# Patient Record
Sex: MALE | Race: BLACK OR AFRICAN AMERICAN | Employment: FULL TIME | URBAN - NONMETROPOLITAN AREA
[De-identification: names, ages, dates, MRNs, and addresses within clinical notes are randomized per-mention and may not be internally consistent; named-entity substitution may affect disease eponyms.]

---

## 2019-02-02 ENCOUNTER — HOSPITAL ENCOUNTER (EMERGENCY)
Age: 24
Discharge: HOME | End: 2019-02-02

## 2019-02-02 VITALS
BODY MASS INDEX: 22.36 KG/M2 | TEMPERATURE: 98 F | SYSTOLIC BLOOD PRESSURE: 131 MMHG | HEART RATE: 73 BPM | WEIGHT: 131 LBS | HEIGHT: 64 IN | RESPIRATION RATE: 18 BRPM | DIASTOLIC BLOOD PRESSURE: 70 MMHG | OXYGEN SATURATION: 98 %

## 2019-02-02 DIAGNOSIS — R59.0 POSTERIOR AURICULAR LYMPHADENOPATHY: Primary | ICD-10-CM

## 2019-02-02 PROCEDURE — 99283 EMERGENCY DEPT VISIT LOW MDM: CPT

## 2019-02-02 ASSESSMENT — ENCOUNTER SYMPTOMS
WHEEZING: 0
VOMITING: 0
BACK PAIN: 0
EYE DISCHARGE: 0
ABDOMINAL PAIN: 0
COUGH: 0
NAUSEA: 0
SHORTNESS OF BREATH: 0
RHINORRHEA: 0
EYE REDNESS: 0
DIARRHEA: 0
SORE THROAT: 0

## 2019-02-02 ASSESSMENT — PAIN DESCRIPTION - LOCATION: LOCATION: HEAD

## 2019-02-02 ASSESSMENT — PAIN DESCRIPTION - DESCRIPTORS: DESCRIPTORS: ACHING;SHOOTING;SHARP

## 2019-02-02 ASSESSMENT — PAIN SCALES - GENERAL: PAINLEVEL_OUTOF10: 3

## 2019-02-02 ASSESSMENT — PAIN DESCRIPTION - ORIENTATION: ORIENTATION: RIGHT

## 2021-05-17 ENCOUNTER — HOSPITAL ENCOUNTER (EMERGENCY)
Age: 26
Discharge: HOME | End: 2021-05-18

## 2021-05-17 VITALS
TEMPERATURE: 97.9 F | OXYGEN SATURATION: 100 % | BODY MASS INDEX: 23.05 KG/M2 | HEART RATE: 73 BPM | DIASTOLIC BLOOD PRESSURE: 75 MMHG | SYSTOLIC BLOOD PRESSURE: 151 MMHG | RESPIRATION RATE: 15 BRPM | WEIGHT: 135 LBS | HEIGHT: 64 IN

## 2021-05-17 DIAGNOSIS — J02.8 BACTERIAL PHARYNGITIS: ICD-10-CM

## 2021-05-17 DIAGNOSIS — B96.89 BACTERIAL PHARYNGITIS: ICD-10-CM

## 2021-05-17 DIAGNOSIS — B34.9 VIRAL ILLNESS: Primary | ICD-10-CM

## 2021-05-17 PROCEDURE — 87804 INFLUENZA ASSAY W/OPTIC: CPT

## 2021-05-17 PROCEDURE — 99282 EMERGENCY DEPT VISIT SF MDM: CPT

## 2021-05-17 PROCEDURE — 87635 SARS-COV-2 COVID-19 AMP PRB: CPT

## 2021-05-17 ASSESSMENT — PAIN DESCRIPTION - DESCRIPTORS: DESCRIPTORS: DISCOMFORT

## 2021-05-18 LAB
FLU A ANTIGEN: NEGATIVE
FLU B ANTIGEN: NEGATIVE
GROUP A STREP CULTURE, REFLEX: NEGATIVE
REFLEX THROAT C + S: NORMAL
SARS-COV-2, NAAT: NOT DETECTED

## 2021-05-18 PROCEDURE — 87070 CULTURE OTHR SPECIMN AEROBIC: CPT

## 2021-05-18 PROCEDURE — 87880 STREP A ASSAY W/OPTIC: CPT

## 2021-05-18 RX ORDER — BENZOCAINE AND MENTHOL, UNSPECIFIED FORM 15; 2.3 MG/1; MG/1
1 LOZENGE ORAL
Qty: 32 LOZENGE | Refills: 0 | Status: SHIPPED | OUTPATIENT
Start: 2021-05-18 | End: 2021-05-23

## 2021-05-18 RX ORDER — AMOXICILLIN AND CLAVULANATE POTASSIUM 875; 125 MG/1; MG/1
1 TABLET, FILM COATED ORAL 2 TIMES DAILY
Qty: 20 TABLET | Refills: 0 | Status: SHIPPED | OUTPATIENT
Start: 2021-05-18 | End: 2021-05-28

## 2021-05-18 NOTE — ED TRIAGE NOTES
Patient presents to he ED via ED lobby with concern for COVID. Patient states that at work today he started to feel generalized body aches and that his throat feels sore. Patient states that when he lays down sometimes it feels hard to breathe.

## 2021-05-20 LAB — THROAT/NOSE CULTURE: NORMAL

## 2021-05-20 ASSESSMENT — ENCOUNTER SYMPTOMS
CHEST TIGHTNESS: 0
BACK PAIN: 0
RHINORRHEA: 0
SORE THROAT: 1
VOMITING: 0
COUGH: 1
NAUSEA: 0

## 2021-05-20 NOTE — ED PROVIDER NOTES
[facility] Emergency Department    CHIEF COMPLAINT       Chief Complaint   Patient presents with    Concern For COVID-19       Nurses Notes reviewed and I agree except as noted in the HPI. HISTORY OF PRESENT ILLNESS    Abel Lee wilver 26 y.o. male who presents to the ED for covid testing. He was at work and started feeling poorly. C/O generalized body aches, sore throat, fatigue and he feels a little SOB when he lays down. HPI was provided by the patient    REVIEW OF SYSTEMS     Review of Systems   Constitutional: Positive for fatigue. Negative for chills and fever. HENT: Positive for sore throat. Negative for congestion, ear discharge, ear pain, postnasal drip and rhinorrhea. Respiratory: Positive for cough. Negative for chest tightness. Gastrointestinal: Negative for nausea and vomiting. Genitourinary: Negative for difficulty urinating, dysuria, enuresis, flank pain and hematuria. Musculoskeletal: Positive for myalgias. Negative for back pain and joint swelling. Skin: Negative for wound. Neurological: Negative for dizziness, light-headedness, numbness and headaches. Psychiatric/Behavioral: Negative for agitation, behavioral problems and confusion. All other systems negative except as noted. PAST MEDICAL HISTORY   No past medical history on file. SURGICALHISTORY      has no past surgical history on file. CURRENT MEDICATIONS       Discharge Medication List as of 5/18/2021  1:05 AM          ALLERGIES     has No Known Allergies. FAMILY HISTORY     has no family status information on file. family history is not on file.     SOCIAL HISTORY       Social History     Socioeconomic History    Marital status: Single     Spouse name: Not on file    Number of children: Not on file    Years of education: Not on file    Highest education level: Not on file   Occupational History    Not on file   Tobacco Use    Smoking status: Not on file   Substance and Sexual Activity    Alcohol use: Not on file    Drug use: Not on file    Sexual activity: Not on file   Other Topics Concern    Not on file   Social History Narrative    Not on file     Social Determinants of Health     Financial Resource Strain:     Difficulty of Paying Living Expenses:    Food Insecurity:     Worried About Running Out of Food in the Last Year:     [address] in the Last Year:    Transportation Needs:     Lack of Transportation (Medical):  Lack of Transportation (Non-Medical):    Physical Activity:     Days of Exercise per Week:     Minutes of Exercise per Session:    Stress:     Feeling of Stress :    Social Connections:     Frequency of Communication with Friends and Family:     Frequency of Social Gatherings with Friends and Family:     Attends L[employer] day Services:     Active Member of Clubs or Organizations:     Attends Club or Organization Meetings:     Marital Status:    Intimate Partner Violence:     Fear of Current or Ex-Partner:     Emotionally Abused:     Physically Abused:     Sexually Abused:        PHYSICAL EXAM     INITIAL VITALS:  height is 5' 4\" (1.626 m) and weight is 135 lb (61.2 kg). His oral temperature is 97.9 °F (36.6 °C). His blood pressure is 151/75 (abnormal) and his pulse is 73. His respiration is 15 and oxygen saturation is 100%. Physical Exam  Vitals and nursing note reviewed. Constitutional:       General: He is not in acute distress. Appearance: He is well-developed. He is not diaphoretic. HENT:      Head: Normocephalic and atraumatic. Nose: Congestion present. Mouth/Throat:      Mouth: Mucous membranes are moist.      Pharynx: Oropharyngeal exudate and posterior oropharyngeal erythema present. Eyes:      General:         Right eye: No discharge. Left eye: No discharge. Conjunctiva/sclera: Conjunctivae normal.   Neck:      Trachea: No tracheal deviation.    Cardiovascular:      Rate and Rhythm: Normal rate and regular rhythm. Heart sounds: Normal heart sounds. No murmur heard. No gallop. Comments: Normal capillary refill  Pulmonary:      Effort: Pulmonary effort is normal. No respiratory distress. Breath sounds: Normal breath sounds. No stridor. Abdominal:      General: Bowel sounds are normal.      Palpations: Abdomen is soft. Musculoskeletal:         General: No tenderness or deformity. Normal range of motion. Cervical back: Normal range of motion. Lymphadenopathy:      Cervical: Cervical adenopathy present. Skin:     General: Skin is warm and dry. Capillary Refill: Capillary refill takes less than 2 seconds. Coloration: Skin is not pale. Findings: No erythema or rash. Neurological:      General: No focal deficit present. Mental Status: He is alert and oriented to person, place, and time. Cranial Nerves: No cranial nerve deficit. Psychiatric:         Mood and Affect: Mood normal.         Behavior: Behavior normal.         DIFFERENTIAL DIAGNOSIS:   flu, strep, PNA, bronchitis, viral illness    DIAGNOSTIC RESULTS     EKG: All EKG's are interpreted by the Emergency Department Physician who eithersigns or Co-signs this chart in the absence of a cardiologist.        RADIOLOGY: non-plainfilm images(s) such as CT, Ultrasound and MRI are read by the radiologist.  Plain radiographic images are visualized and preliminarily interpreted by the emergency physician unless otherwise stated below.   No orders to display         LABS:   Labs Reviewed   RAPID INFLUENZA A/B ANTIGENS   COVID-19, RAPID   CULTURE, THROAT    Narrative:     Source: throat       Site:           Current Antibiotics: not stated   GROUP A STREP, REFLEX       EMERGENCY DEPARTMENT COURSE:   Vitals:    Vitals:    05/17/21 2307   BP: (!) 151/75   Pulse: 73   Resp: 15   Temp: 97.9 °F (36.6 °C)   TempSrc: Oral   SpO2: 100%   Weight: 135 lb (61.2 kg)   Height: 5' 4\" (1.626 m)          MDM MDM    Seen evaluate in the emergency room for Covid testing and sore throat. Appropriate testing is completed. Strep is negative however the patient appears to have a bacterial pharyngitis. We will send a throat culture. Treat empirically given the Centor criteria. Treated with Cepacol as well. Patient is discharged home with follow-up. Medications - No data to display      Patient was seen independently by myself. The patient's final impression and disposition and plan was determined by myself. Strict return precautions and follow up instructions were discussed with the patient prior to discharge, with which the patient agrees. Physical assessment findings, diagnostic testing(s) if applicable, and vital signs reviewed with patient/patient representative. Questions answered. Medications asdirected, including OTC medications for supportive care. Education provided on medications. Differential diagnosis(s) discussed with patient/patient representative. Home care/self care instructions reviewed withpatient/patient representative. Patient is to follow-up with family care provider in 2-3 days if no improvement. Patient is to go to the emergency department if symptoms worsen. Patient/patient representative isaware of care plan, questions answered, verbalizes understanding and is in agreement. CRITICAL CARE:   None    CONSULTS:  None    PROCEDURES:  None    FINAL IMPRESSION     1. Viral illness    2. Bacterial pharyngitis          DISPOSITION/PLAN   DISPOSITION Decision To Discharge 05/18/2021 01:00:50 AM      PATIENT REFERREDTO:  [address] [address],[address]  [facility]. [address] [facility] [address] [facility]  Schedule an appointment as soon as possible for a visit in 2 days  For follow up      DISCHARGE MEDICATIONS:  Discharge Medication List as of 5/18/2021  1:05 AM      START taking these medications    Details   amoxicillin-clavulanate (AUGMENTIN) 875-125 MG per tablet Take 1 tablet by mouth 2 times daily for 10 days, Disp-20 tablet, R-0Print      Benzocaine-Menthol (CEPACOL) 15-2.3 MG LOZG Take 1 each by mouth 6 times daily for 5 days, Disp-32 lozenge, R-0Print             (Please note that portions of this note were completed with a voice recognition program.  Efforts were made to edit the dictations but occasionally words are mis-transcribed.)         NEGIN Karimi - NEGIN De La Rosa - CNP  05/20/21 9053

## 2021-11-05 ENCOUNTER — HOSPITAL ENCOUNTER (EMERGENCY)
Age: 26
Discharge: HOME | End: 2021-11-05
Payer: OTHER GOVERNMENT

## 2021-11-05 VITALS
TEMPERATURE: 98 F | HEART RATE: 94 BPM | BODY MASS INDEX: 23.17 KG/M2 | RESPIRATION RATE: 18 BRPM | DIASTOLIC BLOOD PRESSURE: 67 MMHG | SYSTOLIC BLOOD PRESSURE: 140 MMHG | WEIGHT: 135 LBS | OXYGEN SATURATION: 98 %

## 2021-11-05 DIAGNOSIS — U07.1 LAB TEST POSITIVE FOR DETECTION OF COVID-19 VIRUS: Primary | ICD-10-CM

## 2021-11-05 LAB — SARS-COV-2, NAA: DETECTED

## 2021-11-05 PROCEDURE — 99213 OFFICE O/P EST LOW 20 MIN: CPT | Performed by: NURSE PRACTITIONER

## 2021-11-05 PROCEDURE — 87635 SARS-COV-2 COVID-19 AMP PRB: CPT

## 2021-11-05 PROCEDURE — 99213 OFFICE O/P EST LOW 20 MIN: CPT

## 2021-11-05 RX ORDER — GUAIFENESIN 600 MG/1
600 TABLET, EXTENDED RELEASE ORAL 2 TIMES DAILY
Qty: 20 TABLET | Refills: 0 | Status: SHIPPED | OUTPATIENT
Start: 2021-11-05 | End: 2021-11-15

## 2021-11-05 RX ORDER — NAPROXEN SODIUM 220 MG
550 TABLET ORAL 2 TIMES DAILY WITH MEALS
COMMUNITY
End: 2022-06-01

## 2021-11-05 ASSESSMENT — ENCOUNTER SYMPTOMS
SINUS PRESSURE: 0
EYE REDNESS: 0
NAUSEA: 1
SHORTNESS OF BREATH: 0
COUGH: 1
ABDOMINAL PAIN: 0
SORE THROAT: 0
DIARRHEA: 1
VOMITING: 0
EYE ITCHING: 0

## 2021-11-05 ASSESSMENT — PAIN DESCRIPTION - PAIN TYPE: TYPE: ACUTE PAIN

## 2021-11-05 ASSESSMENT — PAIN DESCRIPTION - FREQUENCY: FREQUENCY: INTERMITTENT

## 2021-11-05 ASSESSMENT — PAIN - FUNCTIONAL ASSESSMENT: PAIN_FUNCTIONAL_ASSESSMENT: PREVENTS OR INTERFERES SOME ACTIVE ACTIVITIES AND ADLS

## 2021-11-05 ASSESSMENT — PAIN DESCRIPTION - LOCATION: LOCATION: CHEST

## 2021-11-05 ASSESSMENT — PAIN DESCRIPTION - DESCRIPTORS: DESCRIPTORS: DISCOMFORT

## 2021-11-05 NOTE — ED TRIAGE NOTES
Patient states no t well for 2 wks, cough, headache, muscle pain, vomiting, diarrhea, chills. Aleve taken. Nasal swab for covid obtained, labeled, taken to lab. Tolerated well. Nurse wearing PPE.

## 2021-11-05 NOTE — LETTER
[facility]  [address] [place] [id number]  Phone: [phone]               November 5, 2021    Patient: Anyi Tubbs   YOB: 1995   Date of Visit: 11/5/2021       To Whom It May Concern:    Mickey [place] was seen and treated in our emergency department on 11/5/2021. Patient is COVID-19 positive. Patient will be required to quarantine according to the Health Department or [address] instructions based on the CDC guidelines and may not return to school/work until quarantine has been completed which is usually 10 days. Patient will have to follow with their family provider for additional school/work note and/or [place] paperwork needs.          Sincerely,         Signature:____Electronically signed by NEGIN Vieira CNP on 11/5/2021 at 11:43 AM  ______________________________

## 2021-11-05 NOTE — ED PROVIDER NOTES
[phone]       Chief Complaint   Patient presents with    Cough    Headache    Covid Testing    Emesis    Diarrhea       Nurses Notes reviewed and I agree except as noted in the HPI. HISTORY OF PRESENT ILLNESS   Lucille Blackman is a 23 y.o. male who presents for evaluation and COVID-19 testing. The history is provided by the patient. URI  Presenting symptoms: cough    Presenting symptoms: no congestion, no ear pain, no fatigue, no fever and no sore throat    Duration:  2 weeks (recently worsened)  Ineffective treatments:  OTC medications  Associated symptoms: headaches    Risk factors: no sick contacts      The patient/patient representative has no other acute complaints at this time. REVIEW OF SYSTEMS     Review of Systems   Constitutional: Negative for chills, fatigue and fever. HENT: Negative for congestion, ear pain, sinus pressure and sore throat. Eyes: Negative for redness and itching. Respiratory: Positive for cough. Negative for shortness of breath. Cardiovascular: Negative for chest pain. Gastrointestinal: Positive for diarrhea and nausea. Negative for abdominal pain and vomiting. Skin: Negative for rash. Allergic/Immunologic: Negative for environmental allergies and food allergies. Neurological: Positive for headaches. PAST MEDICAL HISTORY   History reviewed. No pertinent past medical history. SURGICAL HISTORY     Patient  has a past surgical history that includes Foot surgery and Testicle surgery. CURRENT MEDICATIONS       Discharge Medication List as of 11/5/2021 11:43 AM      CONTINUE these medications which have NOT CHANGED    Details   naproxen sodium (ALEVE) 220 MG tablet Take 550 mg by mouth 2 times daily (with meals)Historical Med             ALLERGIES     Patient is has No Known Allergies. FAMILY HISTORY     Patient'sfamily history is not on file. He was adopted.     SOCIAL HISTORY     Patient  reports that he has never smoked. He has never used smokeless tobacco. He reports current alcohol use. He reports current drug use. Drug: Marijuana Jenny Baum). PHYSICAL EXAM     ED TRIAGE VITALS  BP: (!) 140/67, Temp: 98 °F (36.7 °C), Pulse: 94, Resp: 18, SpO2: 98 %  Physical Exam    DIAGNOSTIC RESULTS   Labs:  Abnormal Labs Reviewed   COVID-19, RAPID - Abnormal; Notable for the following components:       Result Value    SARS-CoV-2, ROSSI DETECTED (*)     All other components within normal limits        IMAGING:  No orders to display     URGENT CARE COURSE:     Vitals:    11/05/21 1128   BP: (!) 140/67   Pulse: 94   Resp: 18   Temp: 98 °F (36.7 °C)   TempSrc: Oral   SpO2: 98%   Weight: 135 lb (61.2 kg)       Medications - No data to display  PROCEDURES:  FINALIMPRESSION      1. Lab test positive for detection of COVID-19 virus        DISPOSITION/PLAN   DISPOSITION    Discharge    ED Course as of Nov 05 1149 Fri Nov 05, 2021   1145 SARS-CoV-2, ROSSI(!!): DETECTED [HA]      ED Course User Index  [address] [facility], [facility]     Must quarantine according to [facility] guidelines. Physical assessment findings, diagnostic testing(s) if applicable, and vital signs reviewed with patient/patient representative. If applicable, patient/patient representative will be contacted upon receipt of final culture and sensitivity or other testing results when available. Any additions or changes to medications or changes the plan of care will be made at that time. Differential diagnosis(s) discussed with patient/patient representative. Patient is to follow-up with family care provider in 2-3 days if no improvement. Patient is to go to the emergency department if symptoms change/worsen. Patient/patient representative is aware of care plan, questions answered, verbalizes understanding and is in agreement. Printed instructions attached to after visit summary.     Problem List Items Addressed This Visit     None      Visit Diagnoses     Lab test positive for detection of COVID-19 virus    -  Primary    Relevant Medications    guaiFENesin (MUCINEX) 600 MG extended release tablet          PATIENT REFERRED TO:  [address] [facility] [address]  [address] [address] [facility] [address]  Schedule an appointment as soon as possible for a visit in 3 days  if you do not have a family provider, If symptoms change/worsen, go to the [address] [place] [address] Urgent Care  [address] [facility] [place] [address] [phone]    as needed, If symptoms change/worsen, go to the [id number] [facility], [address] Ghada Morrison, APRN - CNP  11/05/21 1152

## 2021-11-05 NOTE — ED NOTES
Patient understood instructions verbally,  Follow up with PCP/ Health Dept. with covid concerns, Worse symptoms follow up with ED.ambulated self to lobby,stable condition. All belongings with patient.      Lindsay Arevalo LPN  15/83/24 8700

## 2021-11-08 ENCOUNTER — CARE COORDINATION (OUTPATIENT)
Dept: CARE COORDINATION | Age: 26
End: 2021-11-08

## 2021-11-08 NOTE — CARE COORDINATION
ACM follow up ED visit/COVID monitoring. Pt to the ED with c/o cough, headache, emesis,diarrhea. COVID test Positive. Outreach call to follow up with patient for follow up ED visit/COVID monitoring, no answer and \"mail box is full\". ACM will try again later today or tomorrow.

## 2021-11-09 NOTE — CARE COORDINATION
2nd Outreach call to follow up with patient for follow up ED visit/COVID monitoring, no answer and unable to lvm as \"mail box is full\". If no return call, ACM will attempt to contact again tomorrow. No further outreach scheduled with this CTN/ACM.

## 2022-06-01 ENCOUNTER — OFFICE VISIT (OUTPATIENT)
Dept: FAMILY MEDICINE CLINIC | Age: 27
End: 2022-06-01

## 2022-06-01 VITALS
RESPIRATION RATE: 12 BRPM | DIASTOLIC BLOOD PRESSURE: 62 MMHG | HEIGHT: 64 IN | BODY MASS INDEX: 25.47 KG/M2 | WEIGHT: 149.2 LBS | TEMPERATURE: 97.4 F | OXYGEN SATURATION: 98 % | HEART RATE: 74 BPM | SYSTOLIC BLOOD PRESSURE: 128 MMHG

## 2022-06-01 DIAGNOSIS — R01.1 HEART MURMUR: Primary | ICD-10-CM

## 2022-06-01 PROCEDURE — 99213 OFFICE O/P EST LOW 20 MIN: CPT | Performed by: STUDENT IN AN ORGANIZED HEALTH CARE EDUCATION/TRAINING PROGRAM

## 2022-06-01 PROCEDURE — 93000 ELECTROCARDIOGRAM COMPLETE: CPT | Performed by: STUDENT IN AN ORGANIZED HEALTH CARE EDUCATION/TRAINING PROGRAM

## 2022-06-01 SDOH — ECONOMIC STABILITY: FOOD INSECURITY: WITHIN THE PAST 12 MONTHS, THE FOOD YOU BOUGHT JUST DIDN'T LAST AND YOU DIDN'T HAVE MONEY TO GET MORE.: NEVER TRUE

## 2022-06-01 SDOH — ECONOMIC STABILITY: FOOD INSECURITY: WITHIN THE PAST 12 MONTHS, YOU WORRIED THAT YOUR FOOD WOULD RUN OUT BEFORE YOU GOT MONEY TO BUY MORE.: NEVER TRUE

## 2022-06-01 ASSESSMENT — PATIENT HEALTH QUESTIONNAIRE - PHQ9
SUM OF ALL RESPONSES TO PHQ QUESTIONS 1-9: 0
SUM OF ALL RESPONSES TO PHQ9 QUESTIONS 1 & 2: 0
1. LITTLE INTEREST OR PLEASURE IN DOING THINGS: 0
2. FEELING DOWN, DEPRESSED OR HOPELESS: 0

## 2022-06-01 ASSESSMENT — ENCOUNTER SYMPTOMS
SHORTNESS OF BREATH: 0
WHEEZING: 0
CONSTIPATION: 0
DIARRHEA: 0
COUGH: 0
ABDOMINAL PAIN: 0

## 2022-06-01 ASSESSMENT — SOCIAL DETERMINANTS OF HEALTH (SDOH): HOW HARD IS IT FOR YOU TO PAY FOR THE VERY BASICS LIKE FOOD, HOUSING, MEDICAL CARE, AND HEATING?: NOT HARD AT ALL

## 2022-06-01 NOTE — PROGRESS NOTES
S: 23 y.o. male with   Chief Complaint   Patient presents with   Padmaja Gordon     Pt presents to establish care.  Heart Murmur     Pt is c/o a heart murmur he has had since he was born. He had a physical done at [facility] and they said it was loud and wanted him to see a family doctor. HPI: please see resident note for HPI and ROS. Known about murmur since he was a kid  After echo, told he had an leaky valve  Sisters have vignesh syndrome  No problems with exercise  Former smoker  EKG from [facility] showed Incomplete right bundle branch block and possible L atrial enlargement    BP Readings from Last 3 Encounters:   06/01/22 128/62   11/05/21 (!) 140/67   05/17/21 (!) 151/75     Wt Readings from Last 3 Encounters:   06/01/22 149 lb 3.2 oz (67.7 kg)   11/05/21 135 lb (61.2 kg)   05/17/21 135 lb (61.2 kg)       O: VS:  height is 5' 4\" (1.626 m) and weight is 149 lb 3.2 oz (67.7 kg). His temperature is 97.4 °F (36.3 °C). His blood pressure is 128/62 and his pulse is 74. His respiration is 12 and oxygen saturation is 98%. Diagnosis Orders   1.  Heart murmur  EKG 12 Lead    ECHO 2D WO Color Doppler Complete       Plan:  EKG in office  Ordered Echo to evaluated from structural heart disease      Health Maintenance Due   Topic Date Due    Varicella vaccine (1 of 2 - 2-dose childhood series) Never done    COVID-19 Vaccine (1) Never done    HPV vaccine (1 - Male 2-dose series) Never done    HIV screen  Never done    Hepatitis C screen  Never done         Andreina Devine MD 6/1/2022 4:20 PM

## 2022-06-01 NOTE — PROGRESS NOTES
Devonte Omalley is a 23 y.o. male who presents today for:  Chief Complaint   Patient presents with    Establish Care     Pt presents to establish care.  Heart Murmur     Pt is c/o a heart murmur he has had since he was born. He had a physical done at [facility] and they said it was loud and wanted him to see a family doctor.         HPI:   Barrett Silence in for a job physical  Was told he has a heart murmur  Was told it was loud   Was told they couldn't sign the papers without more workup     Has had it since birth  Denies any pain or palpitations   No passing out, lightheadedness, dizziness   No chest pain, sob     States he had an ECHO when he was much younger and was told he had a leaky valve     Blood sisters has vignesh syndrome   Patient is adopted     Able to review readout, no strips, of an outside EKG   4/22/22 12 lead EKG  Normal sinus rhythm   Possible Left atrial enlargement   Rightward axis   Incomplete right bundle branch block   Borderline ECG   No previous ECGs available   Confirmed by Physician, ED ([id number]),  Korina Khan ([id number]) on 4/25/2022 9:02:00 AM    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  Does not take any medications    Quit smoking in 2018, smokes < 1 year  Minimal alcohol use      Exercise: runs   Diet: no  Last Dentist appt: needs to see   Last optometry appt: needs to see   Sleep: worse currently due to stress          Food Insecurity: No Food Insecurity    Worried About Running Out of Food in the Last Year: Never true    Ran Out of Food in the Last Year: Never true     Health Maintenance reviewed -   Health Maintenance   Topic Date Due    Varicella vaccine (1 of 2 - 2-dose childhood series) Never done    COVID-19 Vaccine (1) Never done    HPV vaccine (1 - Male 2-dose series) Never done    HIV screen  Never done    Hepatitis C screen  Never done    Flu vaccine (Season Ended) 09/01/2022    Depression Screen  06/01/2023    DTaP/Tdap/Td vaccine (2 - Td or Tdap) 03/20/2027    Hepatitis A vaccine  Aged Out    Hepatitis B vaccine  Aged Out    Hib vaccine  Aged Out    Meningococcal (ACWY) vaccine  Aged Out    Pneumococcal 0-64 years Vaccine  Aged Out     Current Outpatient Medications   Medication Sig Dispense Refill    naproxen sodium (ALEVE) 220 MG tablet Take 550 mg by mouth 2 times daily (with meals) (Patient not taking: Reported on 6/1/2022)       No current facility-administered medications for this visit. Social History     Tobacco Use    Smoking status: Never Smoker    Smokeless tobacco: Never Used   Substance Use Topics    Alcohol use: Yes     Comment: occas 2-3 cans of beer every 4-6 months      Subjective:   Review of Systems   Respiratory: Negative for cough, shortness of breath and wheezing. Cardiovascular: Negative for chest pain, palpitations and leg swelling. Gastrointestinal: Negative for abdominal pain, constipation and diarrhea. Objective:     Vitals:    06/01/22 1546   BP: 128/62   Pulse: 74   Resp: 12   Temp: 97.4 °F (36.3 °C)   SpO2: 98%   Weight: 149 lb 3.2 oz (67.7 kg)   Height: 5' 4\" (1.626 m)     Body mass index is 25.61 kg/m². Wt Readings from Last 3 Encounters:   06/01/22 149 lb 3.2 oz (67.7 kg)   11/05/21 135 lb (61.2 kg)   05/17/21 135 lb (61.2 kg)     BP Readings from Last 3 Encounters:   06/01/22 128/62   11/05/21 (!) 140/67   05/17/21 (!) 151/75     Physical Exam  Vitals and nursing note reviewed. Constitutional:       General: He is not in acute distress. Appearance: He is well-developed. He is not diaphoretic. Cardiovascular:      Rate and Rhythm: Normal rate and regular rhythm. Heart sounds: Murmur heard. Comments: 4/6 systolic murmur best heard at the L upper sternal border  Pulmonary:      Effort: Pulmonary effort is normal.      Breath sounds: Normal breath sounds. No wheezing. Abdominal:      General: There is no distension. Palpations: Abdomen is soft. Tenderness: There is no abdominal tenderness. Neurological:      Mental Status: He is alert. Psychiatric:         Thought Content: Thought content normal.         Judgment: Judgment normal.     EKG today showed   -Sinus  Bradycardia   -Incomplete right bundle branch block. -Inferolateral ST-elevation -repolarization variant. Assessment / Plan:   Garo Landis was seen today for establish care and heart murmur. Diagnoses and all orders for this visit:    Heart murmur  -     EKG 12 Lead  -     ECHO 2D WO Color Doppler Complete; Future      Patient presents to discuss his heart murmur    Heart murmur- chronic. Was denies employment physical due to it. Sisters have vignesh syndrome, which can cause heart defects. EKG done today. Will also order an ECHO for further characterization of his murmur. Consider genetics referral in the future for possible vignesh syndrome. Patient to follow-up after testing, 2 weeks      Return in about 2 weeks (around 6/15/2022) for follow up ECHO. Medications Prescribed:  No orders of the defined types were placed in this encounter. Future Appointments   Date Time Provider Remigio Connelly   6/15/2022  2:20 PM Mercy Ingram MD [facility]       Patient given educational materials - see patient instructions. Discussed use, benefit, and side effects of prescribed medications. All patient questions answered. Pt voiced understanding. Instructed to continue current medications, diet and exercise. Patient agreed with treatment plan. Follow up as directed. Part of this visit was documented via [id number] technology, please excuse any errors.      Electronically signed by Mercy Ingram MD on 6/1/2022 at 5:20 PM

## 2022-06-01 NOTE — PROGRESS NOTES
Attending Supervising Physicians Attestation Statement  The patient met the criteria for indirect supervision. I discussed the findings and plans with the resident physician and agree as documented in his note . Diagnosis Orders   1. Heart murmur  EKG 12 Lead    ECHO 2D WO Color Doppler Complete     Heart murmur with irregular rate per occupational health. EKG from April 22, 2022 from [facility] showed possible left atrial enlargement, rightward axis, incomplete right bundle branch. Recommend proceeding with repeat EKG and echo to evaluate for structural heart disease as the EKG from previous is considered abnormal based upon international criteria based on EKG interpretation in athletes.     Electronically signed by Syd Kebede MD on 6/1/22 at 4:18 PM EDT

## 2022-06-08 ENCOUNTER — HOSPITAL ENCOUNTER (OUTPATIENT)
Dept: NON INVASIVE DIAGNOSTICS | Age: 27
Discharge: HOME | End: 2022-06-08

## 2022-06-08 DIAGNOSIS — R01.1 HEART MURMUR: ICD-10-CM

## 2022-06-08 LAB
LV EF: 63 %
LVEF MODALITY: NORMAL

## 2022-06-08 PROCEDURE — 93306 TTE W/DOPPLER COMPLETE: CPT

## 2022-06-13 ENCOUNTER — TELEPHONE (OUTPATIENT)
Dept: FAMILY MEDICINE CLINIC | Age: 27
End: 2022-06-13

## 2022-06-13 ENCOUNTER — TELEPHONE (OUTPATIENT)
Dept: CARDIOLOGY CLINIC | Age: 27
End: 2022-06-13

## 2022-06-13 DIAGNOSIS — R01.1 HEART MURMUR: Primary | ICD-10-CM

## 2022-06-13 DIAGNOSIS — R93.1 ABNORMAL ECHOCARDIOGRAM: ICD-10-CM

## 2022-06-13 NOTE — TELEPHONE ENCOUNTER
Michelle Frausto informed and voiced understanding. Form faxed to [address] [place] [address] at Fax [phone].

## 2022-06-13 NOTE — TELEPHONE ENCOUNTER
----- Message from Garry Watts MD sent at 6/13/2022 12:15 PM EDT -----  Patient has borderline pressures for pulmonary HTN  His heart valves all look normal Recommending patient follow up with cardiology, referral placed today  Paperwork for occupational health addressed, however they may require he see cardiology first. Frantz Edgar is in the completed basket.

## 2022-06-13 NOTE — TELEPHONE ENCOUNTER
Diagnosis   R01.1 (ICD-10-CM) - Heart murmur   R93.1 (ICD-10-CM) - Abnormal echocardiogram     NP referral to Dr Kelli Shahid from Dr Rafaela Ragsdale. LM for patient to return call.

## 2022-06-17 NOTE — TELEPHONE ENCOUNTER
Call to patient. No answer. VM full. Will forward this encounter to Dr Estrella knox [place] for their information. We were unable to contact patient for appointment.

## 2022-06-20 NOTE — TELEPHONE ENCOUNTER
Called and spoke to pt. He said he thinks he has answered their call a few times and hears voices in the background and then the call hangs up. He does want to get an appointment with cardio. I spoke to scheduling for cardio and informed them of this and then transferred the pt to get scheduled.

## 2022-06-28 ENCOUNTER — TELEPHONE (OUTPATIENT)
Dept: CARDIOLOGY CLINIC | Age: 27
End: 2022-06-28

## 2023-02-03 ENCOUNTER — HOSPITAL ENCOUNTER (EMERGENCY)
Age: 28
Discharge: HOME | End: 2023-02-03
Payer: COMMERCIAL

## 2023-02-03 VITALS
DIASTOLIC BLOOD PRESSURE: 82 MMHG | TEMPERATURE: 97.8 F | HEART RATE: 73 BPM | RESPIRATION RATE: 16 BRPM | SYSTOLIC BLOOD PRESSURE: 129 MMHG | OXYGEN SATURATION: 97 %

## 2023-02-03 DIAGNOSIS — K52.9 GASTROENTERITIS: Primary | ICD-10-CM

## 2023-02-03 PROCEDURE — 99213 OFFICE O/P EST LOW 20 MIN: CPT | Performed by: NURSE PRACTITIONER

## 2023-02-03 PROCEDURE — 99213 OFFICE O/P EST LOW 20 MIN: CPT

## 2023-02-03 RX ORDER — ONDANSETRON 4 MG/1
4 TABLET, ORALLY DISINTEGRATING ORAL 3 TIMES DAILY PRN
Qty: 12 TABLET | Refills: 0 | Status: SHIPPED | OUTPATIENT
Start: 2023-02-03

## 2023-02-03 ASSESSMENT — ENCOUNTER SYMPTOMS
ABDOMINAL PAIN: 0
CONSTIPATION: 0
SHORTNESS OF BREATH: 0
NAUSEA: 1
COUGH: 0
SORE THROAT: 0
DIARRHEA: 0
VOMITING: 1

## 2023-02-03 NOTE — Clinical Note
Juan August was seen and treated in our emergency department on 2/3/2023. He may return to work on 02/05/2023. If you have any questions or concerns, please don't hesitate to call.       Zaira Mccall, NEGIN - CNP

## 2023-02-03 NOTE — ED PROVIDER NOTES
[facility]  Urgent Care Encounter       CHIEF COMPLAINT       Chief Complaint   Patient presents with    Nausea    Chills    Emesis     None since yesterday       Nurses Notes reviewed and I agree except as noted in the HPI. HISTORY OF PRESENT ILLNESS   Ras Hernandez is a 33 y.o. male who presents with complaints of nausea, vomiting, chills, and fatigue. His symptoms have been present for the past 5 to 6 days. He has not tried anything for treatment. He he has able to drink oral fluids and stay hydrated. However, he is unable to tolerate solid food. Denies any known fever or headaches. No cough. Denies any sore throat. The history is provided by the patient. REVIEW OF SYSTEMS     Review of Systems   Constitutional:  Positive for chills and fatigue. Negative for fever. HENT:  Negative for congestion and sore throat. Respiratory:  Negative for cough and shortness of breath. Cardiovascular:  Negative for chest pain. Gastrointestinal:  Positive for nausea and vomiting. Negative for abdominal pain, constipation and diarrhea. Musculoskeletal:  Negative for myalgias. Neurological:  Positive for headaches. PAST MEDICAL HISTORY         Diagnosis Date    Heart murmur        SURGICALHISTORY     Patient  has a past surgical history that includes Foot surgery and Testicle surgery. CURRENT MEDICATIONS       Previous Medications    No medications on file       ALLERGIES     Patient is has No Known Allergies. Patients   Immunization History   Administered Date(s) Administered    Tdap (Boostrix, Adacel) 03/20/2017       FAMILY HISTORY     Patient's family history includes Other in his sister. He was adopted. SOCIAL HISTORY     Patient  reports that he has never smoked. He has never used smokeless tobacco. He reports current alcohol use. He reports that he does not currently use drugs after having used the following drugs: Marijuana [employer]).     PHYSICAL EXAM ED TRIAGE VITALS  BP: 129/82, Temp: 97.8 °F (36.6 °C), Heart Rate: 73, Resp: 16, SpO2: 97 %,Estimated body mass index is 26.09 kg/m² as calculated from the following:    Height as of 6/8/22: 5' 4\" (1.626 m). Weight as of 6/8/22: 152 lb (68.9 kg). ,No LMP for male patient. Physical Exam  Vitals and nursing note reviewed. Constitutional:       General: He is not in acute distress. Appearance: He is not diaphoretic. HENT:      Right Ear: Tympanic membrane normal.      Left Ear: Tympanic membrane normal.      Nose: Nose normal.      Mouth/Throat:      Mouth: Mucous membranes are moist.      Pharynx: No posterior oropharyngeal erythema. Cardiovascular:      Rate and Rhythm: Normal rate and regular rhythm. Pulses: Normal pulses. Heart sounds: Murmur heard. Pulmonary:      Effort: Pulmonary effort is normal.      Breath sounds: Normal breath sounds. No wheezing or rales. Abdominal:      General: Abdomen is flat. Bowel sounds are increased. Palpations: Abdomen is soft. Tenderness: There is no abdominal tenderness. There is no right CVA tenderness, left CVA tenderness or guarding. Negative signs include Ramos's sign and McBurney's sign. Lymphadenopathy:      Cervical: No cervical adenopathy. Skin:     General: Skin is warm and dry. Neurological:      Mental Status: He is alert and oriented to person, place, and time. DIAGNOSTIC RESULTS     Labs:No results found for this visit on 02/03/23. IMAGING:  None    EKG:  None    URGENT CARE COURSE:     Vitals:    02/03/23 1435   BP: 129/82   Pulse: 73   Resp: 16   Temp: 97.8 °F (36.6 °C)   SpO2: 97%       Medications - No data to display       PROCEDURES:  None    FINAL IMPRESSION      1. Gastroenteritis      DISPOSITION/ PLAN   DISPOSITION Decision To Discharge 02/03/2023 02:52:55 PM     Clinical exam consistent with gastroenteritis. Will start patient on Zofran for symptom management of nausea and vomiting.   Advised to continue oral fluid intake. Call PCP on Monday if persistent does not improve. Work note provided as requested. PATIENT REFERRED TO:  Bob Dalton, [address] [address] / [facility] [address]      DISCHARGE MEDICATIONS:  New Prescriptions    ONDANSETRON (ZOFRAN-ODT) 4 MG DISINTEGRATING TABLET    Place 1 tablet under the tongue 3 times daily as needed for Nausea or Vomiting       Discontinued Medications    No medications on file       Current Discharge Medication List          NEGIN Gil CNP    (Please note that portions of this note were completed with a voice recognition program. Efforts were made to edit the dictations but occasionally words are mis-transcribed.)            NEGIN Gil CNP  02/03/23 1226

## 2023-02-03 NOTE — ED NOTES
To [facility] with complaints of chills, vomiting - none since yesterday, dry heaves. Started about a week ago.       Ruddy Norman RN  02/03/23 0376

## 2023-02-06 ENCOUNTER — TELEPHONE (OUTPATIENT)
Dept: FAMILY MEDICINE CLINIC | Age: 28
End: 2023-02-06

## 2023-02-06 NOTE — LETTER
[address] [address],[address] [facility]  Phone: [phone]  Fax: [address],         February 6, 2023    [address] [address]      Dear Glynn Morrow:    [facility]  [address], [address]  Phone: [phone]  Fax: [phone]    February 6, 2023    [address] [address]      Dear Glynn Morrow,    This letter is regarding your Emergency Department (ED) visit at [address] [place] [address] on 2/3/23. Dr. Simone Carranza wanted to make sure that you understand your discharge instructions and that you were able to fill any prescriptions that may have been ordered for you. Please contact the office at the above phone number if the ED advised you to follow up with Dr. Simone Carranza, or if you have any further questions or needs. Also did you know -   *Visiting the ED for a non-emergency could result in higher co-pays than you would normally be subject to paying? *You can call your doctor even after hours so they can direct you to the most appropriate care. [address] [place] [address] can often offer you an appointment on the same day that you call. *We have some [facility] offices that offer Walk-in appointments; check our website for availability in your community, [email or web address].      *Evisits are now available for patients for $36 through [employer] for certain conditions:  * Sinus, cold and or cough       * Diarrhea            * Headache  * Heartburn                                * Poison Jacqueline          * Back pain     * Urinary problems                         If you do not have [employer] and are interested, please contact the office and a staff member may assist you or go to [email or web address].     Sincerely,     Marcelo Lopez Tomás He DO and your [facility] Team         Thank you. If you have any questions or concerns, please don't hesitate to call.     Sincerely,        Dorian Hairston DO

## 2023-04-07 ENCOUNTER — APPOINTMENT (OUTPATIENT)
Dept: GENERAL RADIOLOGY | Age: 28
End: 2023-04-07
Payer: COMMERCIAL

## 2023-04-07 ENCOUNTER — HOSPITAL ENCOUNTER (EMERGENCY)
Age: 28
Discharge: HOME | End: 2023-04-07
Attending: EMERGENCY MEDICINE
Payer: COMMERCIAL

## 2023-04-07 VITALS
SYSTOLIC BLOOD PRESSURE: 126 MMHG | HEART RATE: 52 BPM | TEMPERATURE: 97.9 F | RESPIRATION RATE: 16 BRPM | DIASTOLIC BLOOD PRESSURE: 76 MMHG | OXYGEN SATURATION: 100 %

## 2023-04-07 DIAGNOSIS — R11.2 NAUSEA AND VOMITING, UNSPECIFIED VOMITING TYPE: ICD-10-CM

## 2023-04-07 DIAGNOSIS — R42 DIZZINESS: Primary | ICD-10-CM

## 2023-04-07 DIAGNOSIS — I30.9 ACUTE PERICARDITIS, UNSPECIFIED TYPE: ICD-10-CM

## 2023-04-07 LAB
ANION GAP SERPL CALC-SCNC: 13 MEQ/L (ref 8–16)
BASOPHILS ABSOLUTE: 0 THOU/MM3 (ref 0–0.1)
BASOPHILS NFR BLD AUTO: 0.5 %
BUN SERPL-MCNC: 15 MG/DL (ref 7–22)
CALCIUM SERPL-MCNC: 9.3 MG/DL (ref 8.5–10.5)
CHLORIDE SERPL-SCNC: 104 MEQ/L (ref 98–111)
CO2 SERPL-SCNC: 23 MEQ/L (ref 23–33)
CREAT SERPL-MCNC: 0.9 MG/DL (ref 0.4–1.2)
DEPRECATED RDW RBC AUTO: 39.9 FL (ref 35–45)
EOSINOPHIL NFR BLD AUTO: 0.5 %
EOSINOPHILS ABSOLUTE: 0 THOU/MM3 (ref 0–0.4)
ERYTHROCYTE [DISTWIDTH] IN BLOOD BY AUTOMATED COUNT: 12.9 % (ref 11.5–14.5)
FLUAV RNA RESP QL NAA+PROBE: NOT DETECTED
FLUBV RNA RESP QL NAA+PROBE: NOT DETECTED
GFR SERPL CREATININE-BSD FRML MDRD: > 60 ML/MIN/1.73M2
GLUCOSE BLD STRIP.AUTO-MCNC: 95 MG/DL (ref 70–108)
GLUCOSE SERPL-MCNC: 90 MG/DL (ref 70–108)
HCT VFR BLD AUTO: 48.2 % (ref 42–52)
HGB BLD-MCNC: 15 GM/DL (ref 14–18)
IMM GRANULOCYTES # BLD AUTO: 0.03 THOU/MM3 (ref 0–0.07)
IMM GRANULOCYTES NFR BLD AUTO: 0.4 %
LYMPHOCYTES ABSOLUTE: 1.2 THOU/MM3 (ref 1–4.8)
LYMPHOCYTES NFR BLD AUTO: 15.3 %
MCH RBC QN AUTO: 26.8 PG (ref 26–33)
MCHC RBC AUTO-ENTMCNC: 31.1 GM/DL (ref 32.2–35.5)
MCV RBC AUTO: 86.2 FL (ref 80–94)
MONOCYTES ABSOLUTE: 0.6 THOU/MM3 (ref 0.4–1.3)
MONOCYTES NFR BLD AUTO: 8.3 %
NEUTROPHILS NFR BLD AUTO: 75 %
NRBC BLD AUTO-RTO: 0 /100 WBC
OSMOLALITY SERPL CALC.SUM OF ELEC: 279.8 MOSMOL/KG (ref 275–300)
PLATELET # BLD AUTO: 132 THOU/MM3 (ref 130–400)
PLATELET BLD QL SMEAR: ADEQUATE
PMV BLD AUTO: 12.2 FL (ref 9.4–12.4)
POTASSIUM SERPL-SCNC: 4.3 MEQ/L (ref 3.5–5.2)
RBC # BLD AUTO: 5.59 MILL/MM3 (ref 4.7–6.1)
REASON FOR REJECTION: NORMAL
REJECTED TEST: NORMAL
SARS-COV-2 RNA RESP QL NAA+PROBE: NOT DETECTED
SCAN OF BLOOD SMEAR: NORMAL
SEGMENTED NEUTROPHILS ABSOLUTE COUNT: 5.8 THOU/MM3 (ref 1.8–7.7)
SODIUM SERPL-SCNC: 140 MEQ/L (ref 135–145)
TROPONIN T: < 0.01 NG/ML
WBC # BLD AUTO: 7.7 THOU/MM3 (ref 4.8–10.8)

## 2023-04-07 PROCEDURE — 93005 ELECTROCARDIOGRAM TRACING: CPT | Performed by: EMERGENCY MEDICINE

## 2023-04-07 PROCEDURE — 71046 X-RAY EXAM CHEST 2 VIEWS: CPT

## 2023-04-07 PROCEDURE — 36415 COLL VENOUS BLD VENIPUNCTURE: CPT

## 2023-04-07 PROCEDURE — 80048 BASIC METABOLIC PNL TOTAL CA: CPT

## 2023-04-07 PROCEDURE — 87636 SARSCOV2 & INF A&B AMP PRB: CPT

## 2023-04-07 PROCEDURE — 6360000002 HC RX W HCPCS: Performed by: EMERGENCY MEDICINE

## 2023-04-07 PROCEDURE — 96361 HYDRATE IV INFUSION ADD-ON: CPT

## 2023-04-07 PROCEDURE — 84484 ASSAY OF TROPONIN QUANT: CPT

## 2023-04-07 PROCEDURE — 6370000000 HC RX 637 (ALT 250 FOR IP): Performed by: STUDENT IN AN ORGANIZED HEALTH CARE EDUCATION/TRAINING PROGRAM

## 2023-04-07 PROCEDURE — 2580000003 HC RX 258: Performed by: EMERGENCY MEDICINE

## 2023-04-07 PROCEDURE — 99285 EMERGENCY DEPT VISIT HI MDM: CPT

## 2023-04-07 PROCEDURE — 96375 TX/PRO/DX INJ NEW DRUG ADDON: CPT

## 2023-04-07 PROCEDURE — 96374 THER/PROPH/DIAG INJ IV PUSH: CPT

## 2023-04-07 PROCEDURE — 82948 REAGENT STRIP/BLOOD GLUCOSE: CPT

## 2023-04-07 PROCEDURE — 6360000002 HC RX W HCPCS: Performed by: STUDENT IN AN ORGANIZED HEALTH CARE EDUCATION/TRAINING PROGRAM

## 2023-04-07 PROCEDURE — 85025 COMPLETE CBC W/AUTO DIFF WBC: CPT

## 2023-04-07 PROCEDURE — 2580000003 HC RX 258: Performed by: STUDENT IN AN ORGANIZED HEALTH CARE EDUCATION/TRAINING PROGRAM

## 2023-04-07 RX ORDER — 0.9 % SODIUM CHLORIDE 0.9 %
1000 INTRAVENOUS SOLUTION INTRAVENOUS ONCE
Status: COMPLETED | OUTPATIENT
Start: 2023-04-07 | End: 2023-04-07

## 2023-04-07 RX ORDER — KETOROLAC TROMETHAMINE 30 MG/ML
15 INJECTION, SOLUTION INTRAMUSCULAR; INTRAVENOUS ONCE
Status: COMPLETED | OUTPATIENT
Start: 2023-04-07 | End: 2023-04-07

## 2023-04-07 RX ORDER — PROCHLORPERAZINE EDISYLATE 5 MG/ML
10 INJECTION INTRAMUSCULAR; INTRAVENOUS ONCE
Status: COMPLETED | OUTPATIENT
Start: 2023-04-07 | End: 2023-04-07

## 2023-04-07 RX ORDER — IBUPROFEN 600 MG/1
600 TABLET ORAL 3 TIMES DAILY PRN
Qty: 21 TABLET | Refills: 0 | Status: SHIPPED | OUTPATIENT
Start: 2023-04-07 | End: 2023-04-14

## 2023-04-07 RX ORDER — ONDANSETRON 4 MG/1
4 TABLET, ORALLY DISINTEGRATING ORAL 3 TIMES DAILY PRN
Qty: 15 TABLET | Refills: 0 | Status: SHIPPED | OUTPATIENT
Start: 2023-04-07 | End: 2023-04-12

## 2023-04-07 RX ORDER — ONDANSETRON 2 MG/ML
4 INJECTION INTRAMUSCULAR; INTRAVENOUS ONCE
Status: COMPLETED | OUTPATIENT
Start: 2023-04-07 | End: 2023-04-07

## 2023-04-07 RX ORDER — MECLIZINE HCL 25MG 25 MG/1
25 TABLET, CHEWABLE ORAL ONCE
Status: COMPLETED | OUTPATIENT
Start: 2023-04-07 | End: 2023-04-07

## 2023-04-07 RX ADMIN — KETOROLAC TROMETHAMINE 15 MG: 30 INJECTION, SOLUTION INTRAMUSCULAR at 18:32

## 2023-04-07 RX ADMIN — PROCHLORPERAZINE EDISYLATE 10 MG: 5 INJECTION INTRAMUSCULAR; INTRAVENOUS at 20:54

## 2023-04-07 RX ADMIN — MECLIZINE HYDROCHLORIDE 25 MG: 25 TABLET, CHEWABLE ORAL at 20:54

## 2023-04-07 RX ADMIN — SODIUM CHLORIDE 1000 ML: 9 INJECTION, SOLUTION INTRAVENOUS at 20:53

## 2023-04-07 RX ADMIN — ONDANSETRON 4 MG: 2 INJECTION INTRAMUSCULAR; INTRAVENOUS at 18:33

## 2023-04-07 RX ADMIN — SODIUM CHLORIDE 1000 ML: 9 INJECTION, SOLUTION INTRAVENOUS at 18:32

## 2023-04-07 ASSESSMENT — PAIN - FUNCTIONAL ASSESSMENT: PAIN_FUNCTIONAL_ASSESSMENT: NONE - DENIES PAIN

## 2023-04-07 ASSESSMENT — PAIN SCALES - GENERAL: PAINLEVEL_OUTOF10: 0

## 2023-04-07 NOTE — ED NOTES
Patient resting in bed. Respirations easy and unlabored. No distress noted. Call light within reach.        Bharat Chowdhury RN  04/07/23 1111

## 2023-04-07 NOTE — Clinical Note
Veronica Ordonez was seen and treated in our emergency department on 4/7/2023. He may return to work on 04/11/2023. If you have any questions or concerns, please don't hesitate to call.       Shivani Rodriges MD

## 2023-04-07 NOTE — Clinical Note
Nisa Berry was seen and treated in our emergency department on 4/7/2023. He may return to work on 04/11/2023. If you have any questions or concerns, please don't hesitate to call.       Adrien Hyatt MD

## 2023-04-07 NOTE — ED NOTES
Pt to ED via intake with c/o dizziness. Pt reports they were at work when they suddenly became dizzy. Pt reports they have also liana vomiting. Pt reports they have a prescription for zofran but did not take it today. Pt denies abdominal pain. EKG completed. BS checked. IV inserted.      Doris Jorge RN  04/07/23 7608

## 2023-04-08 LAB
EKG ATRIAL RATE: 56 BPM
EKG P AXIS: 36 DEGREES
EKG P-R INTERVAL: 162 MS
EKG Q-T INTERVAL: 406 MS
EKG QRS DURATION: 106 MS
EKG QTC CALCULATION (BAZETT): 391 MS
EKG R AXIS: 64 DEGREES
EKG T AXIS: 68 DEGREES
EKG VENTRICULAR RATE: 56 BPM

## 2023-04-08 PROCEDURE — 93010 ELECTROCARDIOGRAM REPORT: CPT | Performed by: NUCLEAR MEDICINE

## 2023-04-08 NOTE — ED PROVIDER NOTES
[facility]  EMERGENCY MEDICINE ATTENDING ATTESTATION      Evaluation of Arnaldo Masterson. Case discussed and care plan developed with resident physician. I agree with the resident physician documentation and plan as documented by him, except if my documentation differs. Patient seen, interviewed and examined by me. I reviewed the medical, surgical, family and social history, medications and allergies. I have reviewed the nursing documentation. Brief H&P   Patient c/o sudden onset dizziness, nausea, lightheadedness at work after drinking energy drink, followed by headache that has been improving since then. Patient states the symptoms occurred before eating and although he feels slightly better he still feels some's lightheadedness. Physical exam is notable for well appearing, slightly pale, otherwise nonfocal exam.      Medical Decision Making   MDM:   Syncope with unclear cause, still having some symptoms  Plan:   IV line, labs  IV fluids  EKG  Observation in the ED while awaiting results    Please see the resident physician completed note for final disposition except as documented on this attestation. I have reviewed and interpreted all available lab, radiology and ekg results available at the moment. Diagnosis, treatment and disposition plans were discussed and agreed upon by patient. This transcription was electronically signed. It was dictated by use of voice recognition software and electronically transcribed. The transcription may contain errors not detected in proofreading.      I performed direct supervision and was present for the critical portion following procedures: None  Critical care time on this case: None    Electronically signed by Kaylin Prieto MD on 4/7/23 at 7:00 PM EDT        Kaylin Prieto MD  04/07/23 6223
tablet, Take 1 tablet by mouth 3 times daily as needed for Pain, Disp: 21 tablet, Rfl: 0    ondansetron (ZOFRAN-ODT) 4 MG disintegrating tablet, Place 1 tablet under the tongue 3 times daily as needed for Nausea or Vomiting, Disp: 12 tablet, Rfl: 0    Previous Medications    ONDANSETRON (ZOFRAN-ODT) 4 MG DISINTEGRATING TABLET    Place 1 tablet under the tongue 3 times daily as needed for Nausea or Vomiting       SOCIAL HISTORY     Social History     Social History Narrative    Not on file     Social History     Tobacco Use    Smoking status: Never    Smokeless tobacco: Never   Vaping Use    Vaping Use: Never used   Substance Use Topics    Alcohol use: Yes     Comment: occas 2-3 cans of beer every 4-6 months    Drug use: Not Currently     Types: Marijuana [place])     Comment: july 2021       ALLERGIES   No Known Allergies    FAMILY HISTORY     Family History   Adopted: Yes   Problem Relation Age of Onset    Other Sister        PHYSICAL EXAM     ED Triage Vitals [04/07/23 1732]   BP Temp Temp Source Heart Rate Resp SpO2 Height Weight   135/89 97.9 °F (36.6 °C) Oral 68 17 99 % -- --     Initial vital signs and nursing assessment reviewed and normal. There is no height or weight on file to calculate BMI. Additional Vital Signs:  Vitals:    04/07/23 1835   BP: 126/76   Pulse: 52   Resp: 16   Temp:    SpO2: 100%       Physical Exam  Constitutional:       Appearance: He is ill-appearing. He is not toxic-appearing. HENT:      Head: Normocephalic and atraumatic. Nose: Nose normal.      Mouth/Throat:      Mouth: Mucous membranes are moist.   Eyes:      Extraocular Movements: Extraocular movements intact. Pupils: Pupils are equal, round, and reactive to light. Comments: Normal hints exam, no nystagmus    Cardiovascular:      Rate and Rhythm: Regular rhythm. Bradycardia present. Pulses: Normal pulses. Heart sounds: Murmur heard.    Pulmonary:      Effort: Pulmonary effort is normal.      Breath
previous records available on EMR. \",\"Noncontributory\"}. Case discussed with specialties other than Emergency Medicine: {DFTServices REDYTOEWZ:04323::\"VDL Applicable\"}    ED COURSE   ED Medications administered this visit (None if left blank):   Medications - No data to display         CRITICAL CARE:  {Blank multiple:26308::\"Critical Care Diagnosis. *** Requiring frequent monitoring and reassessment during ED course\",\"None\",\"***\"}    PROCEDURES: (None if blank)  Procedures:     MEDICATION CHANGES     New Prescriptions    No medications on file       FINAL DISPOSITION and [address] Making      Shared Decision-Making was performed, disposition discussed with the patient/family and questions answered. Outpatient follow up (If applicable):  No follow-up provider specified. {Blank multiple:30598::\"***\",\"The results of pertinent diagnostic studies and exam findings were discussed with the patient/surrogate. The patients provisional diagnosis and plan of care were discussed with the patient and present family who expressed understanding. Medications were reviewed and indications and risks of medications were discussed with the patient/family present. Strict return precautions and follow up instructions were discussed with the patient prior to discharge, with which the patient agrees. \",\"Not applicable\"}          FINAL DIAGNOSES:  Final diagnoses:   None       Condition: {condition:91475:::1}  Dispo: {Plan; disposition:29170}  DISPOSITION        This transcription was electronically signed. It was dictated by use of voice recognition software and electronically transcribed. The transcription may contain errors not detected in proofreading.

## 2023-04-08 NOTE — DISCHARGE INSTRUCTIONS
You are seen today in the emergency department for dizziness, nausea and vomiting. Your symptoms improved while in the emergency room. Please follow-up with your PCP regarding today's visit. You may need to follow-up with cardiology regarding your heart murmur as well. Your EKG in the emergency room shows possible pericarditis which is inflammation around the area of your heart which possibly could be causing you to feel ill. Please read the following pamphlet on pericarditis. For this you can take ibuprofen. I have sent a prescription for ibuprofen 600 to your pharmacy. If you do not want to fill this, you can just take 3 pills of over-the-counter Advil instead of the 2 pills recommended. I have sent nausea medication to your pharmacy. Please take this as needed for nausea.

## 2023-04-08 NOTE — ED NOTES
Pt ambulated to the bathroom, stated \"I'm actually feeling much better\". Pt medicated per [facility], denies additional needs.       Evens Milan  04/07/23 3726

## 2023-04-12 ENCOUNTER — HOSPITAL ENCOUNTER (EMERGENCY)
Age: 28
Discharge: HOME | End: 2023-04-12
Payer: COMMERCIAL

## 2023-04-12 VITALS
TEMPERATURE: 97.7 F | RESPIRATION RATE: 18 BRPM | DIASTOLIC BLOOD PRESSURE: 80 MMHG | SYSTOLIC BLOOD PRESSURE: 129 MMHG | OXYGEN SATURATION: 99 % | HEART RATE: 66 BPM

## 2023-04-12 DIAGNOSIS — R01.1 MURMUR, CARDIAC: ICD-10-CM

## 2023-04-12 DIAGNOSIS — R07.89 CHEST DISCOMFORT: Primary | ICD-10-CM

## 2023-04-12 PROCEDURE — 99212 OFFICE O/P EST SF 10 MIN: CPT | Performed by: EMERGENCY MEDICINE

## 2023-04-12 PROCEDURE — 99213 OFFICE O/P EST LOW 20 MIN: CPT

## 2023-04-12 ASSESSMENT — PAIN - FUNCTIONAL ASSESSMENT
PAIN_FUNCTIONAL_ASSESSMENT: ACTIVITIES ARE NOT PREVENTED
PAIN_FUNCTIONAL_ASSESSMENT: 0-10

## 2023-04-12 ASSESSMENT — ENCOUNTER SYMPTOMS
VOMITING: 0
ABDOMINAL PAIN: 0
NAUSEA: 1
COUGH: 1
SORE THROAT: 0
SHORTNESS OF BREATH: 0

## 2023-04-12 ASSESSMENT — PAIN DESCRIPTION - LOCATION: LOCATION: CHEST

## 2023-04-12 ASSESSMENT — PAIN DESCRIPTION - PAIN TYPE: TYPE: ACUTE PAIN

## 2023-04-12 ASSESSMENT — PAIN DESCRIPTION - FREQUENCY: FREQUENCY: CONTINUOUS

## 2023-04-12 ASSESSMENT — PAIN DESCRIPTION - ORIENTATION: ORIENTATION: RIGHT

## 2023-04-12 NOTE — ED TRIAGE NOTES
To room with c/o nausea, cough, and chest discomfort. Seen in the ER on Friday. Has an appointment to see a cardiologist. He needs a work note if able to go.

## 2023-04-12 NOTE — DISCHARGE INSTRUCTIONS
You have an appointment with cardiology on Friday, April 14 at 9:30 AM.  Arrive 15 minutes early    Follow-up with your family physician as needed

## 2023-04-12 NOTE — ED PROVIDER NOTES
[facility]  Urgent Care Encounter       CHIEF COMPLAINT       Chief Complaint   Patient presents with    Nausea    Cough       Nurses Notes reviewed and I agree except as noted in the HPI. HISTORY OF PRESENT ILLNESS   Doris Rosenbaum is a 33 y.o. male who presents for nausea, cough and chest discomfort. Patient has had symptoms for 5 days. States they are getting better. He was in the emergency department at the onset of his symptoms and had an entire cardiac work-up performed. Bedside ultrasound by ED physicians was concerning for a problematic mitral valve. The patient was advised to follow-up with cardiology, however, they did not provide him with an appointment. The patient states his symptoms are improving but he is not sure what he supposed to do next. He does not have an appointment. He did not call his primary care provider. HPI    REVIEW OF SYSTEMS     Review of Systems   Constitutional:  Positive for fatigue. Negative for activity change and fever. HENT:  Negative for congestion and sore throat. Respiratory:  Positive for cough. Negative for shortness of breath. Cardiovascular:  Positive for chest pain. Negative for palpitations and leg swelling. Gastrointestinal:  Positive for nausea. Negative for abdominal pain and vomiting. PAST MEDICAL HISTORY         Diagnosis Date    Heart murmur        SURGICALHISTORY     Patient  has a past surgical history that includes Foot surgery and Testicle surgery.     CURRENT MEDICATIONS       Discharge Medication List as of 4/12/2023  2:42 PM        CONTINUE these medications which have NOT CHANGED    Details   !! ondansetron (ZOFRAN-ODT) 4 MG disintegrating tablet Take 1 tablet by mouth 3 times daily as needed for Nausea or Vomiting, Disp-15 tablet, R-0Normal      ibuprofen (ADVIL;MOTRIN) 600 MG tablet Take 1 tablet by mouth 3 times daily as needed for Pain, Disp-21 tablet, R-0Normal      !! ondansetron (ZOFRAN-ODT)

## 2023-04-19 ENCOUNTER — HOSPITAL ENCOUNTER (INPATIENT)
Age: 28
LOS: 4 days | Discharge: HOME | DRG: 885 | End: 2023-04-23
Attending: EMERGENCY MEDICINE | Admitting: PSYCHIATRY & NEUROLOGY
Payer: COMMERCIAL

## 2023-04-19 ENCOUNTER — OFFICE VISIT (OUTPATIENT)
Dept: FAMILY MEDICINE CLINIC | Age: 28
End: 2023-04-19
Payer: COMMERCIAL

## 2023-04-19 VITALS
HEIGHT: 64 IN | SYSTOLIC BLOOD PRESSURE: 132 MMHG | HEART RATE: 79 BPM | OXYGEN SATURATION: 99 % | RESPIRATION RATE: 12 BRPM | WEIGHT: 149.2 LBS | TEMPERATURE: 98.2 F | BODY MASS INDEX: 25.47 KG/M2 | DIASTOLIC BLOOD PRESSURE: 84 MMHG

## 2023-04-19 DIAGNOSIS — R42 INTERMITTENT LIGHTHEADEDNESS: ICD-10-CM

## 2023-04-19 DIAGNOSIS — R45.851 SUICIDAL IDEATIONS: ICD-10-CM

## 2023-04-19 DIAGNOSIS — R11.2 NAUSEA AND VOMITING, UNSPECIFIED VOMITING TYPE: ICD-10-CM

## 2023-04-19 DIAGNOSIS — R01.1 MURMUR: ICD-10-CM

## 2023-04-19 DIAGNOSIS — F32.2 CURRENT SEVERE EPISODE OF MAJOR DEPRESSIVE DISORDER WITHOUT PSYCHOTIC FEATURES, UNSPECIFIED WHETHER RECURRENT (HCC): Primary | ICD-10-CM

## 2023-04-19 PROBLEM — F33.0 MDD (MAJOR DEPRESSIVE DISORDER), RECURRENT EPISODE, MILD (HCC): Status: ACTIVE | Noted: 2023-04-19

## 2023-04-19 LAB
ALBUMIN SERPL BCG-MCNC: 4.8 G/DL (ref 3.5–5.1)
ALP SERPL-CCNC: 56 U/L (ref 38–126)
ALT SERPL W/O P-5'-P-CCNC: 14 U/L (ref 11–66)
AMPHETAMINES UR QL SCN: NEGATIVE
ANION GAP SERPL CALC-SCNC: 10 MEQ/L (ref 8–16)
AST SERPL-CCNC: 17 U/L (ref 5–40)
BARBITURATES UR QL SCN: NEGATIVE
BASOPHILS ABSOLUTE: 0 THOU/MM3 (ref 0–0.1)
BASOPHILS NFR BLD AUTO: 0.4 %
BENZODIAZ UR QL SCN: NEGATIVE
BILIRUB SERPL-MCNC: 0.4 MG/DL (ref 0.3–1.2)
BUN SERPL-MCNC: 19 MG/DL (ref 7–22)
BZE UR QL SCN: NEGATIVE
CALCIUM SERPL-MCNC: 9.4 MG/DL (ref 8.5–10.5)
CANNABINOIDS UR QL SCN: POSITIVE
CHLORIDE SERPL-SCNC: 105 MEQ/L (ref 98–111)
CO2 SERPL-SCNC: 22 MEQ/L (ref 23–33)
CREAT SERPL-MCNC: 1.1 MG/DL (ref 0.4–1.2)
DEPRECATED RDW RBC AUTO: 40.9 FL (ref 35–45)
EOSINOPHIL NFR BLD AUTO: 0.6 %
EOSINOPHILS ABSOLUTE: 0 THOU/MM3 (ref 0–0.4)
ERYTHROCYTE [DISTWIDTH] IN BLOOD BY AUTOMATED COUNT: 12.9 % (ref 11.5–14.5)
ETHANOL SERPL-MCNC: < 0.01 %
FENTANYL: NEGATIVE
GFR SERPL CREATININE-BSD FRML MDRD: > 60 ML/MIN/1.73M2
GLUCOSE SERPL-MCNC: 98 MG/DL (ref 70–108)
HCT VFR BLD AUTO: 43.6 % (ref 42–52)
HGB BLD-MCNC: 13.4 GM/DL (ref 14–18)
IMM GRANULOCYTES # BLD AUTO: 0.01 THOU/MM3 (ref 0–0.07)
IMM GRANULOCYTES NFR BLD AUTO: 0.2 %
LIPASE SERPL-CCNC: 31.9 U/L (ref 5.6–51.3)
LYMPHOCYTES ABSOLUTE: 1 THOU/MM3 (ref 1–4.8)
LYMPHOCYTES NFR BLD AUTO: 20.1 %
MAGNESIUM SERPL-MCNC: 1.9 MG/DL (ref 1.6–2.4)
MCH RBC QN AUTO: 26.9 PG (ref 26–33)
MCHC RBC AUTO-ENTMCNC: 30.7 GM/DL (ref 32.2–35.5)
MCV RBC AUTO: 87.6 FL (ref 80–94)
MONOCYTES ABSOLUTE: 0.5 THOU/MM3 (ref 0.4–1.3)
MONOCYTES NFR BLD AUTO: 9.7 %
NEUTROPHILS NFR BLD AUTO: 69 %
NRBC BLD AUTO-RTO: 0 /100 WBC
OPIATES UR QL SCN: NEGATIVE
OSMOLALITY SERPL CALC.SUM OF ELEC: 276.1 MOSMOL/KG (ref 275–300)
OXYCODONE: NEGATIVE
PCP UR QL SCN: NEGATIVE
PLATELET # BLD AUTO: 161 THOU/MM3 (ref 130–400)
PMV BLD AUTO: 11.3 FL (ref 9.4–12.4)
POTASSIUM SERPL-SCNC: 4.3 MEQ/L (ref 3.5–5.2)
PROT SERPL-MCNC: 7.6 G/DL (ref 6.1–8)
RBC # BLD AUTO: 4.98 MILL/MM3 (ref 4.7–6.1)
REASON FOR REJECTION: NORMAL
REJECTED TEST: NORMAL
SEGMENTED NEUTROPHILS ABSOLUTE COUNT: 3.5 THOU/MM3 (ref 1.8–7.7)
SODIUM SERPL-SCNC: 137 MEQ/L (ref 135–145)
TSH SERPL DL<=0.005 MIU/L-ACNC: 2.24 UIU/ML (ref 0.4–4.2)
WBC # BLD AUTO: 5 THOU/MM3 (ref 4.8–10.8)

## 2023-04-19 PROCEDURE — 84443 ASSAY THYROID STIM HORMONE: CPT

## 2023-04-19 PROCEDURE — 6370000000 HC RX 637 (ALT 250 FOR IP): Performed by: PSYCHIATRY & NEUROLOGY

## 2023-04-19 PROCEDURE — 85025 COMPLETE CBC W/AUTO DIFF WBC: CPT

## 2023-04-19 PROCEDURE — 83690 ASSAY OF LIPASE: CPT

## 2023-04-19 PROCEDURE — 36415 COLL VENOUS BLD VENIPUNCTURE: CPT

## 2023-04-19 PROCEDURE — 82077 ASSAY SPEC XCP UR&BREATH IA: CPT

## 2023-04-19 PROCEDURE — 99285 EMERGENCY DEPT VISIT HI MDM: CPT

## 2023-04-19 PROCEDURE — 1240000000 HC EMOTIONAL WELLNESS R&B

## 2023-04-19 PROCEDURE — 80307 DRUG TEST PRSMV CHEM ANLYZR: CPT

## 2023-04-19 PROCEDURE — 99215 OFFICE O/P EST HI 40 MIN: CPT

## 2023-04-19 PROCEDURE — 80053 COMPREHEN METABOLIC PANEL: CPT

## 2023-04-19 PROCEDURE — 83735 ASSAY OF MAGNESIUM: CPT

## 2023-04-19 PROCEDURE — 93005 ELECTROCARDIOGRAM TRACING: CPT | Performed by: EMERGENCY MEDICINE

## 2023-04-19 RX ORDER — TRAZODONE HYDROCHLORIDE 50 MG/1
50 TABLET ORAL NIGHTLY PRN
Status: DISCONTINUED | OUTPATIENT
Start: 2023-04-19 | End: 2023-04-23 | Stop reason: HOSPADM

## 2023-04-19 RX ORDER — MAGNESIUM HYDROXIDE/ALUMINUM HYDROXICE/SIMETHICONE 120; 1200; 1200 MG/30ML; MG/30ML; MG/30ML
30 SUSPENSION ORAL EVERY 6 HOURS PRN
Status: DISCONTINUED | OUTPATIENT
Start: 2023-04-19 | End: 2023-04-23 | Stop reason: HOSPADM

## 2023-04-19 RX ORDER — IBUPROFEN 400 MG/1
400 TABLET ORAL EVERY 6 HOURS PRN
Status: DISCONTINUED | OUTPATIENT
Start: 2023-04-19 | End: 2023-04-23 | Stop reason: HOSPADM

## 2023-04-19 RX ORDER — HYDROXYZINE HYDROCHLORIDE 25 MG/1
50 TABLET, FILM COATED ORAL 3 TIMES DAILY PRN
Status: DISCONTINUED | OUTPATIENT
Start: 2023-04-19 | End: 2023-04-23 | Stop reason: HOSPADM

## 2023-04-19 RX ORDER — ACETAMINOPHEN 325 MG/1
650 TABLET ORAL EVERY 4 HOURS PRN
Status: DISCONTINUED | OUTPATIENT
Start: 2023-04-19 | End: 2023-04-23 | Stop reason: HOSPADM

## 2023-04-19 RX ADMIN — ACETAMINOPHEN 650 MG: 325 TABLET ORAL at 21:52

## 2023-04-19 RX ADMIN — HYDROXYZINE HYDROCHLORIDE 50 MG: 25 TABLET, FILM COATED ORAL at 21:52

## 2023-04-19 SDOH — ECONOMIC STABILITY: HOUSING INSECURITY
IN THE LAST 12 MONTHS, WAS THERE A TIME WHEN YOU DID NOT HAVE A STEADY PLACE TO SLEEP OR SLEPT IN A SHELTER (INCLUDING NOW)?: NO

## 2023-04-19 SDOH — ECONOMIC STABILITY: FOOD INSECURITY: WITHIN THE PAST 12 MONTHS, YOU WORRIED THAT YOUR FOOD WOULD RUN OUT BEFORE YOU GOT MONEY TO BUY MORE.: SOMETIMES TRUE

## 2023-04-19 SDOH — ECONOMIC STABILITY: INCOME INSECURITY: HOW HARD IS IT FOR YOU TO PAY FOR THE VERY BASICS LIKE FOOD, HOUSING, MEDICAL CARE, AND HEATING?: NOT VERY HARD

## 2023-04-19 SDOH — ECONOMIC STABILITY: FOOD INSECURITY: WITHIN THE PAST 12 MONTHS, THE FOOD YOU BOUGHT JUST DIDN'T LAST AND YOU DIDN'T HAVE MONEY TO GET MORE.: SOMETIMES TRUE

## 2023-04-19 ASSESSMENT — PATIENT HEALTH QUESTIONNAIRE - PHQ9
1. LITTLE INTEREST OR PLEASURE IN DOING THINGS: 3
SUM OF ALL RESPONSES TO PHQ QUESTIONS 1-9: 17
6. FEELING BAD ABOUT YOURSELF - OR THAT YOU ARE A FAILURE OR HAVE LET YOURSELF OR YOUR FAMILY DOWN: 1
SUM OF ALL RESPONSES TO PHQ QUESTIONS 1-9: 16
10. IF YOU CHECKED OFF ANY PROBLEMS, HOW DIFFICULT HAVE THESE PROBLEMS MADE IT FOR YOU TO DO YOUR WORK, TAKE CARE OF THINGS AT HOME, OR GET ALONG WITH OTHER PEOPLE: 1
5. POOR APPETITE OR OVEREATING: 1
SUM OF ALL RESPONSES TO PHQ QUESTIONS 1-9: 16
3. TROUBLE FALLING OR STAYING ASLEEP: 3
SUM OF ALL RESPONSES TO PHQ QUESTIONS 1-9: 17
2. FEELING DOWN, DEPRESSED OR HOPELESS: 3
4. FEELING TIRED OR HAVING LITTLE ENERGY: 3
8. MOVING OR SPEAKING SO SLOWLY THAT OTHER PEOPLE COULD HAVE NOTICED. OR THE OPPOSITE, BEING SO FIGETY OR RESTLESS THAT YOU HAVE BEEN MOVING AROUND A LOT MORE THAN USUAL: 1
7. TROUBLE CONCENTRATING ON THINGS, SUCH AS READING THE NEWSPAPER OR WATCHING TELEVISION: 1
SUM OF ALL RESPONSES TO PHQ QUESTIONS 1-9: 16
9. THOUGHTS THAT YOU WOULD BE BETTER OFF DEAD, OR OF HURTING YOURSELF: 1
SUM OF ALL RESPONSES TO PHQ9 QUESTIONS 1 & 2: 6
SUM OF ALL RESPONSES TO PHQ QUESTIONS 1-9: 17

## 2023-04-19 ASSESSMENT — ENCOUNTER SYMPTOMS
NAUSEA: 1
CONSTIPATION: 0
COUGH: 0
WHEEZING: 0
SHORTNESS OF BREATH: 0
DIARRHEA: 0
ABDOMINAL PAIN: 0
VOMITING: 1
STRIDOR: 0

## 2023-04-19 ASSESSMENT — LIFESTYLE VARIABLES
HOW MANY STANDARD DRINKS CONTAINING ALCOHOL DO YOU HAVE ON A TYPICAL DAY: PATIENT DOES NOT DRINK
HOW OFTEN DO YOU HAVE A DRINK CONTAINING ALCOHOL: NEVER
HOW MANY STANDARD DRINKS CONTAINING ALCOHOL DO YOU HAVE ON A TYPICAL DAY: PATIENT DOES NOT DRINK
HOW OFTEN DO YOU HAVE A DRINK CONTAINING ALCOHOL: NEVER

## 2023-04-19 ASSESSMENT — PAIN DESCRIPTION - PAIN TYPE: TYPE: ACUTE PAIN

## 2023-04-19 ASSESSMENT — COLUMBIA-SUICIDE SEVERITY RATING SCALE - C-SSRS
4. HAVE YOU HAD THESE THOUGHTS AND HAD SOME INTENTION OF ACTING ON THEM?: YES
3. HAVE YOU BEEN THINKING ABOUT HOW YOU MIGHT KILL YOURSELF?: YES
2. HAVE YOU ACTUALLY HAD ANY THOUGHTS OF KILLING YOURSELF?: YES
7. DID THIS OCCUR IN THE LAST THREE MONTHS: NO
1. WITHIN THE PAST MONTH, HAVE YOU WISHED YOU WERE DEAD OR WISHED YOU COULD GO TO SLEEP AND NOT WAKE UP?: YES
5. HAVE YOU STARTED TO WORK OUT OR WORKED OUT THE DETAILS OF HOW TO KILL YOURSELF? DO YOU INTEND TO CARRY OUT THIS PLAN?: NO
6. HAVE YOU EVER DONE ANYTHING, STARTED TO DO ANYTHING, OR PREPARED TO DO ANYTHING TO END YOUR LIFE?: YES

## 2023-04-19 ASSESSMENT — PAIN DESCRIPTION - LOCATION: LOCATION: ABDOMEN

## 2023-04-19 ASSESSMENT — SLEEP AND FATIGUE QUESTIONNAIRES
DO YOU USE A SLEEP AID: NO
DO YOU HAVE DIFFICULTY SLEEPING: YES
AVERAGE NUMBER OF SLEEP HOURS: 2
DO YOU HAVE DIFFICULTY SLEEPING: YES
SLEEP PATTERN: DISTURBED/INTERRUPTED SLEEP
SLEEP PATTERN: DIFFICULTY FALLING ASLEEP;DISTURBED/INTERRUPTED SLEEP;DIFFICULTY ARISING
DO YOU USE A SLEEP AID: COMMENT

## 2023-04-19 ASSESSMENT — PAIN DESCRIPTION - ORIENTATION: ORIENTATION: MID

## 2023-04-19 ASSESSMENT — PAIN DESCRIPTION - ONSET: ONSET: GRADUAL

## 2023-04-19 ASSESSMENT — PAIN DESCRIPTION - FREQUENCY: FREQUENCY: INTERMITTENT

## 2023-04-19 ASSESSMENT — PAIN SCALES - GENERAL
PAINLEVEL_OUTOF10: 2
PAINLEVEL_OUTOF10: 0
PAINLEVEL_OUTOF10: 0

## 2023-04-19 ASSESSMENT — PAIN - FUNCTIONAL ASSESSMENT
PAIN_FUNCTIONAL_ASSESSMENT: NONE - DENIES PAIN
PAIN_FUNCTIONAL_ASSESSMENT: ACTIVITIES ARE NOT PREVENTED

## 2023-04-19 ASSESSMENT — PAIN DESCRIPTION - DESCRIPTORS: DESCRIPTORS: SHARP

## 2023-04-19 NOTE — PATIENT INSTRUCTIONS
Thank you   Thank you for trusting us with your healthcare needs. You may receive a survey regarding today's visit. It would help us out if you would take a few moments to provide your feedback. We value your input. Please bring in ALL medications BOTTLES, including inhalers, herbal supplements, over the counter, prescribed & non-prescribed medicine. The office would like actual medication bottles and a list.   Please note our OFFICE POLICIES:   Prior to getting your labs drawn, please check with your insurance company for benefits and eligibility of lab services. Often, insurance companies cover certain tests for preventative visits only. It is patient's responsibility to see what is covered. We are unable to change a diagnosis after the test has been performed. Lab orders will not be re-printed. Please hold onto your original lab orders and take them to your lab to be completed. If you no show your scheduled appointment three times, you will be dismissed from this practice. Reschedules must be completed 24 hours prior to your schedule appointment. If the list below has been completed, PLEASE FAX RECORDS TO OUR OFFICE @ [phone].  Once the records have been received we will update your records at our office:  Health Maintenance Due   Topic Date Due    COVID-19 Vaccine (1) Never done    Varicella vaccine (1 of 2 - 2-dose childhood series) Never done    Pneumococcal 0-64 years Vaccine (1 - PCV) Never done    HPV vaccine (1 - Male 2-dose series) Never done    HIV screen  Never done    Hepatitis C screen  Never done

## 2023-04-20 PROBLEM — F33.2 SEVERE EPISODE OF RECURRENT MAJOR DEPRESSIVE DISORDER, WITHOUT PSYCHOTIC FEATURES (HCC): Status: ACTIVE | Noted: 2023-04-19

## 2023-04-20 PROBLEM — E44.0 MODERATE MALNUTRITION (HCC): Chronic | Status: ACTIVE | Noted: 2023-04-20

## 2023-04-20 LAB
CRP SERPL-MCNC: < 0.3 MG/DL (ref 0–1)
D DIMER PPP IA.FEU-MCNC: < 215 NG/ML FEU (ref 0–500)
ERYTHROCYTE [SEDIMENTATION RATE] IN BLOOD BY WESTERGREN METHOD: 1 MM/HR (ref 0–10)

## 2023-04-20 PROCEDURE — APPSS30 APP SPLIT SHARED TIME 16-30 MINUTES: Performed by: PHYSICIAN ASSISTANT

## 2023-04-20 PROCEDURE — 36415 COLL VENOUS BLD VENIPUNCTURE: CPT

## 2023-04-20 PROCEDURE — 85379 FIBRIN DEGRADATION QUANT: CPT

## 2023-04-20 PROCEDURE — 93010 ELECTROCARDIOGRAM REPORT: CPT | Performed by: INTERNAL MEDICINE

## 2023-04-20 PROCEDURE — 6370000000 HC RX 637 (ALT 250 FOR IP): Performed by: PHYSICIAN ASSISTANT

## 2023-04-20 PROCEDURE — 6370000000 HC RX 637 (ALT 250 FOR IP): Performed by: PSYCHIATRY & NEUROLOGY

## 2023-04-20 PROCEDURE — 86140 C-REACTIVE PROTEIN: CPT

## 2023-04-20 PROCEDURE — 85651 RBC SED RATE NONAUTOMATED: CPT

## 2023-04-20 PROCEDURE — 1240000000 HC EMOTIONAL WELLNESS R&B

## 2023-04-20 RX ORDER — ONDANSETRON 4 MG/1
4 TABLET, ORALLY DISINTEGRATING ORAL 3 TIMES DAILY PRN
Status: DISCONTINUED | OUTPATIENT
Start: 2023-04-20 | End: 2023-04-23 | Stop reason: HOSPADM

## 2023-04-20 RX ORDER — SERTRALINE HYDROCHLORIDE 25 MG/1
25 TABLET, FILM COATED ORAL DAILY
Status: DISCONTINUED | OUTPATIENT
Start: 2023-04-20 | End: 2023-04-21

## 2023-04-20 RX ADMIN — HYDROXYZINE HYDROCHLORIDE 50 MG: 25 TABLET, FILM COATED ORAL at 21:01

## 2023-04-20 RX ADMIN — SERTRALINE 25 MG: 25 TABLET, FILM COATED ORAL at 13:53

## 2023-04-20 RX ADMIN — ONDANSETRON 4 MG: 4 TABLET, ORALLY DISINTEGRATING ORAL at 14:43

## 2023-04-20 ASSESSMENT — PAIN SCALES - GENERAL: PAINLEVEL_OUTOF10: 0

## 2023-04-21 PROCEDURE — 6370000000 HC RX 637 (ALT 250 FOR IP): Performed by: PHYSICIAN ASSISTANT

## 2023-04-21 PROCEDURE — 1240000000 HC EMOTIONAL WELLNESS R&B

## 2023-04-21 PROCEDURE — 99223 1ST HOSP IP/OBS HIGH 75: CPT | Performed by: INTERNAL MEDICINE

## 2023-04-21 PROCEDURE — 6370000000 HC RX 637 (ALT 250 FOR IP): Performed by: PSYCHIATRY & NEUROLOGY

## 2023-04-21 PROCEDURE — APPSS30 APP SPLIT SHARED TIME 16-30 MINUTES: Performed by: PHYSICIAN ASSISTANT

## 2023-04-21 RX ADMIN — SERTRALINE 25 MG: 25 TABLET, FILM COATED ORAL at 07:37

## 2023-04-21 RX ADMIN — TRAZODONE HYDROCHLORIDE 50 MG: 50 TABLET ORAL at 21:23

## 2023-04-21 RX ADMIN — HYDROXYZINE HYDROCHLORIDE 50 MG: 25 TABLET, FILM COATED ORAL at 21:23

## 2023-04-21 RX ADMIN — HYDROXYZINE HYDROCHLORIDE 50 MG: 25 TABLET, FILM COATED ORAL at 07:37

## 2023-04-21 ASSESSMENT — PAIN SCALES - GENERAL: PAINLEVEL_OUTOF10: 0

## 2023-04-22 PROCEDURE — APPSS30 APP SPLIT SHARED TIME 16-30 MINUTES: Performed by: NURSE PRACTITIONER

## 2023-04-22 PROCEDURE — 6370000000 HC RX 637 (ALT 250 FOR IP): Performed by: PSYCHIATRY & NEUROLOGY

## 2023-04-22 PROCEDURE — 1240000000 HC EMOTIONAL WELLNESS R&B

## 2023-04-22 RX ADMIN — HYDROXYZINE HYDROCHLORIDE 50 MG: 25 TABLET, FILM COATED ORAL at 21:33

## 2023-04-22 RX ADMIN — SERTRALINE 50 MG: 50 TABLET, FILM COATED ORAL at 08:43

## 2023-04-22 RX ADMIN — ACETAMINOPHEN 650 MG: 325 TABLET ORAL at 04:55

## 2023-04-22 RX ADMIN — TRAZODONE HYDROCHLORIDE 50 MG: 50 TABLET ORAL at 21:33

## 2023-04-22 ASSESSMENT — PAIN DESCRIPTION - LOCATION: LOCATION: HEAD

## 2023-04-22 ASSESSMENT — PAIN DESCRIPTION - ONSET: ONSET: AWAKENED FROM SLEEP

## 2023-04-22 ASSESSMENT — PAIN - FUNCTIONAL ASSESSMENT: PAIN_FUNCTIONAL_ASSESSMENT: ACTIVITIES ARE NOT PREVENTED

## 2023-04-22 ASSESSMENT — PAIN SCALES - GENERAL
PAINLEVEL_OUTOF10: 0
PAINLEVEL_OUTOF10: 5
PAINLEVEL_OUTOF10: 0

## 2023-04-22 ASSESSMENT — PAIN DESCRIPTION - PAIN TYPE: TYPE: ACUTE PAIN

## 2023-04-22 ASSESSMENT — PAIN DESCRIPTION - FREQUENCY: FREQUENCY: INTERMITTENT

## 2023-04-22 ASSESSMENT — PAIN DESCRIPTION - DESCRIPTORS: DESCRIPTORS: ACHING

## 2023-04-22 ASSESSMENT — PAIN DESCRIPTION - ORIENTATION: ORIENTATION: ANTERIOR

## 2023-04-22 NOTE — PROGRESS NOTES
[address] [facility] [address] [address]  Dept: [phone]  Loc: [phone]      Please see Resident note for complete HPI. Patient with history of heart murmur, recent pericarditis  PHQ-9 positive. SI with active plan  Agreeable to ED for further evaluation    ROS per Resident    Lab Results   Component Value Date    WBC 7.7 04/07/2023    HGB 15.0 04/07/2023    HCT 48.2 04/07/2023    MCV 86.2 04/07/2023     04/07/2023     Lab Results   Component Value Date     04/07/2023    K 4.3 04/07/2023     04/07/2023    CO2 23 04/07/2023    BUN 15 04/07/2023    CREATININE 0.9 04/07/2023    GLUCOSE 90 04/07/2023    CALCIUM 9.3 04/07/2023    LABGLOM >60 04/07/2023     No results found for: LABA1C  No results found for: EAG  No results found for: Liliam Otero  No results found for: TSH, D6KXLXD, O1KJZVI, THYROIDAB, FT3, T4FREE  No results found for: CHOL  No results found for: TRIG  No results found for: HDL  No results found for: LDLCHOLESTEROL, LDLCALC  No results found for: LABVLDL, VLDL  No results found for: CHOLHDLRATIO  No results found for: PSA, PSADIA    Health Maintenance Due   Topic Date Due    COVID-19 Vaccine (1) Never done    Varicella vaccine (1 of 2 - 2-dose childhood series) Never done    Pneumococcal 0-64 years Vaccine (1 - PCV) Never done    HPV vaccine (1 - Male 2-dose series) Never done    HIV screen  Never done    Hepatitis C screen  Never done         Physical Exam per Resident     No diagnosis found. Plan  I participated in the discussion and care of this patient     PHQ-9 positive with SI/with plan -- [facility]. Send to ED for further evaluation.    Heart murmur - will order Echo
Apr/22/2023, provider received secure text notification from Inpatient regarding holtzer monitor. Per cardiology note, would be changed to 30 day event monitor. Changed outpatient order.
Health Maintenance Due   Topic Date Due    COVID-19 Vaccine (1) Never done    Varicella vaccine (1 of 2 - 2-dose childhood series) Never done    Pneumococcal 0-64 years Vaccine (1 - PCV) Never done    HPV vaccine (1 - Male 2-dose series) Never done    HIV screen  Never done    Hepatitis C screen  Never done
S: 33 y.o. male with   Chief Complaint   Patient presents with    Follow-up     Discuss Work Restrictions         Reviewed hx and ROS in detail with resident prior to exam.  See notes for additional details. BP Readings from Last 3 Encounters:   04/19/23 132/84   04/12/23 129/80   04/07/23 126/76     Wt Readings from Last 3 Encounters:   04/19/23 149 lb 3.2 oz (67.7 kg)   06/08/22 152 lb (68.9 kg)   06/01/22 149 lb 3.2 oz (67.7 kg)           O: VS:  height is 5' 4\" (1.626 m) and weight is 149 lb 3.2 oz (67.7 kg). His temporal temperature is 98.2 °F (36.8 °C). His blood pressure is 132/84 and his pulse is 79. His respiration is 12 and oxygen saturation is 99%. AAO/NAD, flat affect  Appropriately dressed, no agitation noted  Endorses increasing SI and plan/intent     Diagnosis Orders   1. Current severe episode of major depressive disorder without psychotic features, unspecified whether recurrent ([facility] [address]            Plan      Pt to be escorted by security to ED for psychological evaluation and possible admission for increasing SI with plan/intent. Health Maintenance Due   Topic Date Due    COVID-19 Vaccine (1) Never done    Varicella vaccine (1 of 2 - 2-dose childhood series) Never done    Pneumococcal 0-64 years Vaccine (1 - PCV) Never done    HPV vaccine (1 - Male 2-dose series) Never done    HIV screen  Never done    Hepatitis C screen  Never done         Attending Physician Statement  I have discussed the case, including pertinent history and exam findings with the resident. I also have seen the patient and performed key portions of the examination. I agree with the documented assessment and plan as documented by the resident.   GC modifier added to this encounter      Sukh Rainey MD 4/19/2023 3:44 PM
flat. Affect is not labile, blunt, angry, tearful or inappropriate. Speech: Speech normal. He is communicative. Speech is not delayed. Behavior: Behavior is withdrawn. Behavior is not agitated, slowed, aggressive or hyperactive. Behavior is cooperative. Thought Content: Thought content is not paranoid or delusional. Thought content includes suicidal ideation. Thought content does not include homicidal ideation. Thought content includes suicidal plan. Thought content does not include homicidal plan. Cognition and Memory: Cognition normal. Cognition is not impaired. Memory is not impaired. On this date 4/19/2023 I have spent 30 minutes reviewing previous notes, test results and face to face with the patient discussing the diagnosis and importance of compliance with the treatment plan as well as documenting on the day of the visit. An electronic signature was used to authenticate this note.     --Jorge Ruiz MD

## 2023-04-23 VITALS
HEART RATE: 75 BPM | DIASTOLIC BLOOD PRESSURE: 65 MMHG | OXYGEN SATURATION: 96 % | HEIGHT: 64 IN | SYSTOLIC BLOOD PRESSURE: 114 MMHG | WEIGHT: 147 LBS | RESPIRATION RATE: 16 BRPM | BODY MASS INDEX: 25.1 KG/M2 | TEMPERATURE: 97.4 F

## 2023-04-23 PROCEDURE — 6370000000 HC RX 637 (ALT 250 FOR IP): Performed by: PSYCHIATRY & NEUROLOGY

## 2023-04-23 PROCEDURE — 93270 REMOTE 30 DAY ECG REV/REPORT: CPT

## 2023-04-23 PROCEDURE — 5130000000 HC BRIDGE APPOINTMENT

## 2023-04-23 RX ORDER — HYDROXYZINE 50 MG/1
50 TABLET, FILM COATED ORAL 3 TIMES DAILY PRN
Qty: 90 TABLET | Refills: 0 | Status: SHIPPED | OUTPATIENT
Start: 2023-04-23 | End: 2023-05-23

## 2023-04-23 RX ADMIN — SERTRALINE 50 MG: 50 TABLET, FILM COATED ORAL at 08:44

## 2023-04-23 ASSESSMENT — PAIN SCALES - GENERAL
PAINLEVEL_OUTOF10: 3
PAINLEVEL_OUTOF10: 0

## 2023-04-24 ENCOUNTER — TELEPHONE (OUTPATIENT)
Dept: FAMILY MEDICINE CLINIC | Age: 28
End: 2023-04-24

## 2023-04-24 ENCOUNTER — TELEPHONE (OUTPATIENT)
Dept: PSYCHIATRY | Age: 28
End: 2023-04-24

## 2023-04-24 NOTE — TELEPHONE ENCOUNTER
Call to patient to evaluate if he had any questions related to recent stay. No answer at the number provided but a voicemail was left with contact information.

## 2023-04-24 NOTE — TELEPHONE ENCOUNTER
Care Transitions Initial Follow Up Call    Outreach made within 2 business days of discharge: Yes    Patient: Leanne Childress Patient : 1995   MRN: [id number]  Reason for Admission: There are no discharge diagnoses documented for the most recent discharge.   Discharge Date: 23       Spoke with: [employer] [phone]    Discharge department/facility: [facility]    TCM Interactive Patient Contact:    Scheduled appointment with PCP within 7-14 days    Follow Up  Future Appointments   Date Time Provider Remigio Connelly   2023 11:30 AM Jasmeet Franks, [address], [address]       [place], [place]

## 2023-04-24 NOTE — DISCHARGE SUMMARY
Provider Discharge Summary     Patient ID:  Jaden Putnam  [id number]  18 y.o.  1995    Admit date: 4/19/2023    Discharge date and time: 4/23/2023  9:06 PM     Admitting Physician: Samir Manriquez MD     Discharge Physician: Samir Manriquez MD    Admission Diagnoses: MDD (major depressive disorder), recurrent episode, mild ([address] [F33.0]    Discharge Diagnoses:      Severe episode of recurrent major depressive disorder, without psychotic features [facility])     Patient Active Problem List   Diagnosis Code    Systolic murmur Z86.5    Severe episode of recurrent major depressive disorder, without psychotic features ([address] F33.2    Moderate malnutrition ([address] E44.0        Admission Condition: poor    Discharged Condition: stable    Indication for Admission: threat to self    History of Present Illnes (present tense wording is of findings from admission exam and are not necessarily indicative of current findings):   Jaden Putnam is a 33 y.o. male with a history of depression who presented to the emergency department  under an [facility] via campus police due to suicidal ideation. Per the [facility] [employer] note: \"Pt was sent from the family medicine residency for a routine visit. Per their documentation, \"Suicidal ideation with means to act upon ideations. \" Pt states he has been having suicidal ideations for a few years and \"since I was little\". Pt states they are typically fleeting and stronger at night. Pt states the SI have been more frequent recently. States he last had suicidal thoughts three days ago. Regarding a plan, pt states \"they have been going on for so long I have ideas but nothing set\". Regarding intent, pt states \"I do not plan on doing anything yet\". Pt reports a suicide attempt when he was younger by strangulation. Pt reports worsening mood and history of childhood trauma. Pt is a . \"      Breachaitanya Loya reports he is admitted because he \"answered questions about

## 2023-04-26 ENCOUNTER — TELEPHONE (OUTPATIENT)
Dept: CARDIOLOGY CLINIC | Age: 28
End: 2023-04-26

## 2023-04-27 LAB
EKG ATRIAL RATE: 67 BPM
EKG P AXIS: 43 DEGREES
EKG P-R INTERVAL: 174 MS
EKG Q-T INTERVAL: 388 MS
EKG QRS DURATION: 106 MS
EKG QTC CALCULATION (BAZETT): 409 MS
EKG R AXIS: 68 DEGREES
EKG T AXIS: 67 DEGREES
EKG VENTRICULAR RATE: 67 BPM

## 2023-05-02 NOTE — TELEPHONE ENCOUNTER
Lm for pt to call this office/ need to make him an appt with dr Albino Martinez     Strips scanned in under media tab